# Patient Record
Sex: FEMALE | Race: WHITE | NOT HISPANIC OR LATINO | Employment: OTHER | URBAN - METROPOLITAN AREA
[De-identification: names, ages, dates, MRNs, and addresses within clinical notes are randomized per-mention and may not be internally consistent; named-entity substitution may affect disease eponyms.]

---

## 2017-01-13 ENCOUNTER — HOSPITAL ENCOUNTER (OUTPATIENT)
Dept: MAMMOGRAPHY | Facility: HOSPITAL | Age: 77
Discharge: HOME | End: 2017-01-13
Admitting: FAMILY MEDICINE

## 2017-01-13 DIAGNOSIS — Z12.31 VISIT FOR SCREENING MAMMOGRAM: ICD-10-CM

## 2017-01-13 PROCEDURE — 77063 BREAST TOMOSYNTHESIS BI: CPT | Performed by: RADIOLOGY

## 2017-01-13 PROCEDURE — G0202 SCR MAMMO BI INCL CAD: HCPCS

## 2017-01-13 PROCEDURE — G0202 SCR MAMMO BI INCL CAD: HCPCS | Performed by: RADIOLOGY

## 2017-01-13 PROCEDURE — 77063 BREAST TOMOSYNTHESIS BI: CPT

## 2017-02-21 RX ORDER — SPIRONOLACTONE 25 MG/1
TABLET ORAL
Qty: 90 TABLET | Refills: 0 | Status: SHIPPED | OUTPATIENT
Start: 2017-02-21 | End: 2017-11-17

## 2017-05-10 ENCOUNTER — LAB (OUTPATIENT)
Dept: LAB | Facility: HOSPITAL | Age: 77
End: 2017-05-10

## 2017-05-10 ENCOUNTER — OFFICE VISIT (OUTPATIENT)
Dept: CARDIOLOGY | Facility: CLINIC | Age: 77
End: 2017-05-10

## 2017-05-10 VITALS
DIASTOLIC BLOOD PRESSURE: 74 MMHG | BODY MASS INDEX: 26.13 KG/M2 | HEART RATE: 74 BPM | HEIGHT: 62 IN | SYSTOLIC BLOOD PRESSURE: 120 MMHG | WEIGHT: 142 LBS

## 2017-05-10 DIAGNOSIS — I27.20 PULMONARY HYPERTENSION (HCC): ICD-10-CM

## 2017-05-10 DIAGNOSIS — IMO0001 NORMAL CORONARY ARTERIES: ICD-10-CM

## 2017-05-10 DIAGNOSIS — E78.2 MIXED HYPERLIPIDEMIA: ICD-10-CM

## 2017-05-10 DIAGNOSIS — I50.32 CHRONIC DIASTOLIC HEART FAILURE (HCC): Primary | ICD-10-CM

## 2017-05-10 DIAGNOSIS — N18.2 KIDNEY DISEASE, CHRONIC, STAGE II (GFR 60-89 ML/MIN): ICD-10-CM

## 2017-05-10 DIAGNOSIS — I34.0 NON-RHEUMATIC MITRAL REGURGITATION: ICD-10-CM

## 2017-05-10 DIAGNOSIS — I10 ESSENTIAL HYPERTENSION: ICD-10-CM

## 2017-05-10 LAB
ALBUMIN SERPL-MCNC: 4 G/DL (ref 3.2–4.8)
ALBUMIN/GLOB SERPL: 1.5 G/DL (ref 1.5–2.5)
ALP SERPL-CCNC: 114 U/L (ref 25–100)
ALT SERPL W P-5'-P-CCNC: 14 U/L (ref 7–40)
ANION GAP SERPL CALCULATED.3IONS-SCNC: 7 MMOL/L (ref 3–11)
AST SERPL-CCNC: 21 U/L (ref 0–33)
BILIRUB SERPL-MCNC: 0.4 MG/DL (ref 0.3–1.2)
BUN BLD-MCNC: 24 MG/DL (ref 9–23)
BUN/CREAT SERPL: 17.1 (ref 7–25)
CALCIUM SPEC-SCNC: 9.3 MG/DL (ref 8.7–10.4)
CHLORIDE SERPL-SCNC: 100 MMOL/L (ref 99–109)
CO2 SERPL-SCNC: 26 MMOL/L (ref 20–31)
CREAT BLD-MCNC: 1.4 MG/DL (ref 0.6–1.3)
GFR SERPL CREATININE-BSD FRML MDRD: 36 ML/MIN/1.73
GLOBULIN UR ELPH-MCNC: 2.6 GM/DL
GLUCOSE BLD-MCNC: 140 MG/DL (ref 70–100)
POTASSIUM BLD-SCNC: 4.7 MMOL/L (ref 3.5–5.5)
PROT SERPL-MCNC: 6.6 G/DL (ref 5.7–8.2)
SODIUM BLD-SCNC: 133 MMOL/L (ref 132–146)

## 2017-05-10 PROCEDURE — 80053 COMPREHEN METABOLIC PANEL: CPT | Performed by: NURSE PRACTITIONER

## 2017-05-10 PROCEDURE — 99213 OFFICE O/P EST LOW 20 MIN: CPT | Performed by: INTERNAL MEDICINE

## 2017-05-10 PROCEDURE — 36415 COLL VENOUS BLD VENIPUNCTURE: CPT

## 2017-05-12 ENCOUNTER — TELEPHONE (OUTPATIENT)
Dept: CARDIOLOGY | Facility: CLINIC | Age: 77
End: 2017-05-12

## 2017-06-21 ENCOUNTER — HOSPITAL ENCOUNTER (OUTPATIENT)
Dept: GENERAL RADIOLOGY | Facility: HOSPITAL | Age: 77
Discharge: HOME | End: 2017-06-21
Admitting: FAMILY MEDICINE

## 2017-06-21 DIAGNOSIS — R05.9 COUGH: ICD-10-CM

## 2017-06-21 PROCEDURE — 71020 HC CHEST PA AND LATERAL: CPT

## 2017-06-21 PROCEDURE — 71020 XR CHEST PA AND LATERAL: CPT | Performed by: RADIOLOGY

## 2017-08-08 ENCOUNTER — TRANSCRIBE ORDERS (OUTPATIENT)
Dept: MAMMOGRAPHY | Facility: HOSPITAL | Age: 77
End: 2017-08-08

## 2017-08-08 DIAGNOSIS — Z12.31 VISIT FOR SCREENING MAMMOGRAM: Primary | ICD-10-CM

## 2017-08-10 RX ORDER — LISINOPRIL 20 MG/1
TABLET ORAL
Qty: 180 TABLET | Refills: 3 | Status: SHIPPED | OUTPATIENT
Start: 2017-08-10 | End: 2017-11-17 | Stop reason: SDUPTHER

## 2017-11-17 ENCOUNTER — OFFICE VISIT (OUTPATIENT)
Dept: CARDIOLOGY | Facility: CLINIC | Age: 77
End: 2017-11-17

## 2017-11-17 VITALS
BODY MASS INDEX: 25.37 KG/M2 | HEART RATE: 72 BPM | WEIGHT: 143.2 LBS | DIASTOLIC BLOOD PRESSURE: 82 MMHG | HEIGHT: 63 IN | SYSTOLIC BLOOD PRESSURE: 143 MMHG

## 2017-11-17 DIAGNOSIS — I10 ESSENTIAL HYPERTENSION: ICD-10-CM

## 2017-11-17 DIAGNOSIS — I34.0 NON-RHEUMATIC MITRAL REGURGITATION: Primary | ICD-10-CM

## 2017-11-17 DIAGNOSIS — I50.32 CHRONIC DIASTOLIC HEART FAILURE (HCC): ICD-10-CM

## 2017-11-17 DIAGNOSIS — E78.2 MIXED HYPERLIPIDEMIA: ICD-10-CM

## 2017-11-17 DIAGNOSIS — IMO0001 NORMAL CORONARY ARTERIES: ICD-10-CM

## 2017-11-17 PROCEDURE — 99214 OFFICE O/P EST MOD 30 MIN: CPT | Performed by: INTERNAL MEDICINE

## 2017-11-17 RX ORDER — LISINOPRIL 10 MG/1
10 TABLET ORAL DAILY
Qty: 90 TABLET | Refills: 3
Start: 2017-11-17 | End: 2018-11-12

## 2017-11-17 RX ORDER — CHLORAL HYDRATE 500 MG
CAPSULE ORAL
COMMUNITY

## 2017-11-17 RX ORDER — ROSUVASTATIN CALCIUM 10 MG/1
10 TABLET, COATED ORAL DAILY
Qty: 30 TABLET | Refills: 11
Start: 2017-11-17 | End: 2018-11-12

## 2017-11-17 RX ORDER — LEVOTHYROXINE SODIUM 112 UG/1
112 TABLET ORAL DAILY
COMMUNITY

## 2017-11-17 NOTE — PROGRESS NOTES
Encounter Date:11/17/2017    Patient ID: Shayna Mcmahon is a 77 y.o. female who resides in [place], [place].    CC/Reason for visit:  Chronic Diastolic Heart Failure (6 month follow up); Hypertension; Hyperlipidemia; and Mitral Valve Regurgitation            Shayna Mcmahon returns to my office as a 6 month follow up of her chronic diastolic heart failure, mitral valve regurgitation, hypertension, and hyperlipidemia. Since last visit, patient has been feeling well overall from a cardiovascular standpoint. She states that her breathing is stable unless she over-exerts herself--i.e. walking up a hill or walking up stairs carrying a baby.  He has recently started seeing  for renal insufficiency.  He stop her spironolactone and started her on lisinopril.  She is scheduled to see him on Monday.  She denies orthopnea PND or exertional chest pain.        Review of Systems   Constitution: Negative for weakness and malaise/fatigue.   Eyes: Negative for vision loss in left eye and vision loss in right eye.   Cardiovascular: Positive for leg swelling. Negative for chest pain, dyspnea on exertion, near-syncope, orthopnea, palpitations, paroxysmal nocturnal dyspnea and syncope.   Respiratory: Positive for wheezing.    Skin: Positive for dry skin and itching.   Musculoskeletal: Positive for arthritis and neck pain. Negative for myalgias.   Gastrointestinal: Positive for flatus.   Genitourinary: Positive for bladder incontinence and frequency.   Neurological: Negative for brief paralysis, excessive daytime sleepiness, focal weakness, numbness and paresthesias.   All other systems reviewed and are negative.      The patient's past medical, social, family history and ROS reviewed in the patient's electronic medical record.    Allergies  Garlic and Tylenol [acetaminophen]    Outpatient Prescriptions Marked as Taking for the 11/17/17 encounter (Office Visit) with Keegan Rodriguez IV, MD   Medication Sig Dispense Refill  "  • aspirin 81 MG EC tablet Take 81 mg by mouth daily.     • Calcium Citrate-Vitamin D (CALCIUM + D PO) Take  by mouth Daily.     • desloratadine (CLARINEX) 5 MG tablet Take 5 mg by mouth daily.     • esomeprazole (NexIUM) 40 MG capsule Take 40 mg by mouth daily.     • furosemide (LASIX) 40 MG tablet Take 1 tablet by mouth Daily. 90 tablet 2   • levothyroxine (SYNTHROID, LEVOTHROID) 112 MCG tablet Take 112 mcg by mouth Daily.     • lisinopril (PRINIVIL,ZESTRIL) 10 MG tablet Take 1 tablet by mouth Daily for 360 days. 90 tablet 3   • meclizine (ANTIVERT) 25 MG tablet Take 25 mg by mouth daily.     • Omega-3 Fatty Acids (FISH OIL) 1000 MG capsule capsule Take  by mouth Daily With Breakfast.     • rosuvastatin (CRESTOR) 10 MG tablet Take 1 tablet by mouth Daily for 360 days. 30 tablet 11   • [DISCONTINUED] lisinopril (PRINIVIL,ZESTRIL) 20 MG tablet TAKE ONE TABLET BY MOUTH TWICE A  tablet 3   • [DISCONTINUED] rosuvastatin (CRESTOR) 10 MG tablet Take 10 mg by mouth daily.     • [DISCONTINUED] SYNTHROID 125 MCG tablet Take 118 mcg by mouth Daily.           Blood pressure 143/82, pulse 72, height 63\" (160 cm), weight 143 lb 3.2 oz (65 kg).  Body mass index is 25.37 kg/(m^2).    Physical Exam   Constitutional: She is oriented to person, place, and time. She appears well-developed and well-nourished.   HENT:   Head: Normocephalic and atraumatic.   Eyes: Pupils are equal, round, and reactive to light. No scleral icterus.   Neck: No JVD present. Carotid bruit is not present. No thyromegaly present.   Cardiovascular: Normal rate, regular rhythm, S1 normal and S2 normal.  Exam reveals no gallop.    Murmur heard.  High-pitched blowing holosystolic murmur is present with a grade of 1/6  at the apex  Pulmonary/Chest: Effort normal and breath sounds normal.   Abdominal: Soft. There is no hepatosplenomegaly. There is no tenderness.   Neurological: She is alert and oriented to person, place, and time.   Skin: Skin is warm and " dry. No cyanosis. Nails show no clubbing.   Psychiatric: She has a normal mood and affect. Her behavior is normal.       Data Review:     Lab Results   Component Value Date    GLUCOSE 140 (H) 05/10/2017    BUN 24 (H) 05/10/2017    CREATININE 1.40 (H) 05/10/2017    EGFRIFNONA 36 (L) 05/10/2017    EGFRIFAFRI  09/20/2016      Comment:      <15 Indicative of kidney failure.    BCR 17.1 05/10/2017    K 4.7 05/10/2017    CO2 26.0 05/10/2017    CALCIUM 9.3 05/10/2017    ALBUMIN 4.00 05/10/2017    LABIL2 1.5 05/10/2017    AST 21 05/10/2017    ALT 14 05/10/2017     Procedures       Problem List Items Addressed This Visit        Cardiovascular and Mediastinum    Chronic diastolic heart failure    Overview     · History of cardiac catheterization with normal coronary arteries at [facility]; data deficit.  · Echocardiogram (9/30/2015):  LVEF 60%.  Moderate MR.  RVSP 57 mmHg.         Current Assessment & Plan     · Stable functional class II symptoms  · Continue lisinopril and furosemide as directed by Dr. Soto         Relevant Medications    lisinopril (PRINIVIL,ZESTRIL) 10 MG tablet    Essential hypertension    Relevant Medications    lisinopril (PRINIVIL,ZESTRIL) 10 MG tablet    Hyperlipidemia    Overview     · Statin therapy for primary prevention         Current Assessment & Plan     · Dr. Washington to manage statin therapy         Relevant Medications    rosuvastatin (CRESTOR) 10 MG tablet    Non-rheumatic mitral regurgitation - Primary    Overview     · Moderate MR echocardiogram 09/30/2015         Normal coronary arteries    Overview     · Cardiac catheterization at [facility]: Normal coronary arteries. data deficit                    · Continue present medical therapy  · Return to clinic in one year    Keegan Rodriguez IV, MD  11/17/2017    Scribed for Keegan Rodriguez IV, MD by Albert Yoon. 11/17/2017  5:37 PM

## 2017-11-17 NOTE — ASSESSMENT & PLAN NOTE
· Stable functional class II symptoms  · Continue lisinopril and furosemide as directed by Dr. Soto

## 2018-02-15 ENCOUNTER — APPOINTMENT (OUTPATIENT)
Dept: MAMMOGRAPHY | Facility: HOSPITAL | Age: 78
End: 2018-02-15

## 2018-12-11 ENCOUNTER — OFFICE VISIT (OUTPATIENT)
Dept: CARDIOLOGY | Facility: CLINIC | Age: 78
End: 2018-12-11

## 2018-12-11 VITALS
HEART RATE: 71 BPM | DIASTOLIC BLOOD PRESSURE: 82 MMHG | BODY MASS INDEX: 26.22 KG/M2 | SYSTOLIC BLOOD PRESSURE: 140 MMHG | HEIGHT: 63 IN | WEIGHT: 148 LBS

## 2018-12-11 DIAGNOSIS — I34.0 NON-RHEUMATIC MITRAL REGURGITATION: ICD-10-CM

## 2018-12-11 DIAGNOSIS — I50.32 CHRONIC DIASTOLIC HEART FAILURE (HCC): Primary | ICD-10-CM

## 2018-12-11 DIAGNOSIS — I10 ESSENTIAL HYPERTENSION: ICD-10-CM

## 2018-12-11 DIAGNOSIS — E78.2 MIXED HYPERLIPIDEMIA: ICD-10-CM

## 2018-12-11 PROCEDURE — 99213 OFFICE O/P EST LOW 20 MIN: CPT | Performed by: INTERNAL MEDICINE

## 2018-12-11 RX ORDER — LISINOPRIL 20 MG/1
TABLET ORAL DAILY
COMMUNITY
Start: 2018-10-31

## 2018-12-11 NOTE — PROGRESS NOTES
Encounter Date:12/11/2018    Patient ID: Shayna Mcmahon is a  78 y.o. female who resides in [place], [place].    CC/Reason for visit:  Non-rheumatic mitral regurgitation and Pulmonary hypertension           Problem List Items Addressed This Visit        Cardiology Problems    Chronic diastolic heart failure (CMS/HCC) - Primary    Overview     · History of cardiac catheterization with normal coronary arteries at [facility]; data deficit.  · Echocardiogram (9/30/2015):  LVEF 60%.  Moderate MR.  RVSP 57 mmHg.         Non-rheumatic mitral regurgitation    Overview     · Moderate MR echocardiogram 09/30/2015         Essential hypertension    Relevant Medications    lisinopril (PRINIVIL,ZESTRIL) 20 MG tablet    Hyperlipidemia    Overview     · Statin therapy for primary prevention             78-year-old female with chronic diastolic heart failure, mitral regurgitation, pulmonary hypertension who is been stable from a cardiovascular standpoint.       · Continue present medical therapy.  · Return in about 1 year (around 12/11/2019) for Follow-up with [facility] only.         Shayna Mcmahon returns to the office today for annual follow-up of her chronic diastolic heart failure, mitral valve regurgitation, hypertension, and hyperlipidemia. Since last visit, she has been doing well overall from a cardiovascular standpoint. She monitors her BP at home, with readings typically around 140/80 mmHg. Patient denies chest pain, shortness of breath, PND, edema, palpitations, or syncope at this time.     Review of Systems   Constitution: Negative for weakness and malaise/fatigue.   Eyes: Negative for vision loss in left eye and vision loss in right eye.   Cardiovascular: Negative for chest pain, dyspnea on exertion, near-syncope, orthopnea, palpitations, paroxysmal nocturnal dyspnea and syncope.   Musculoskeletal: Positive for arthritis and falls. Negative for myalgias.   Neurological: Negative for brief paralysis, excessive daytime  "sleepiness, focal weakness, numbness and paresthesias.   Psychiatric/Behavioral: Positive for memory loss.   All other systems reviewed and are negative.      The patient's past medical, social, family history and ROS reviewed in the patient's electronic medical record.    Allergies  Garlic and Tylenol [acetaminophen]    Outpatient Medications Marked as Taking for the 12/11/18 encounter (Office Visit) with Keegan Rodriguez IV, MD   Medication Sig Dispense Refill   • aspirin 81 MG EC tablet Take 81 mg by mouth daily.     • Calcium Citrate-Vitamin D (CALCIUM + D PO) Take  by mouth Daily.     • desloratadine (CLARINEX) 5 MG tablet Take 5 mg by mouth daily.     • esomeprazole (NexIUM) 40 MG capsule Take 40 mg by mouth daily.     • furosemide (LASIX) 40 MG tablet Take 1 tablet by mouth Daily. 90 tablet 2   • levothyroxine (SYNTHROID, LEVOTHROID) 112 MCG tablet Take 112 mcg by mouth Daily.     • lisinopril (PRINIVIL,ZESTRIL) 20 MG tablet Daily.     • meclizine (ANTIVERT) 25 MG tablet Take 25 mg by mouth daily.     • Omega-3 Fatty Acids (FISH OIL) 1000 MG capsule capsule Take  by mouth Daily With Breakfast.           Blood pressure 140/82, pulse 71, height 160 cm (63\"), weight 67.1 kg (148 lb).  Body mass index is 26.22 kg/m².       Physical Exam   Constitutional: She is oriented to person, place, and time. She appears well-developed and well-nourished.   HENT:   Head: Normocephalic and atraumatic.   Eyes: Pupils are equal, round, and reactive to light. No scleral icterus.   Neck: No JVD present. Carotid bruit is not present. No thyromegaly present.   Cardiovascular: Normal rate, regular rhythm, S1 normal and S2 normal. Exam reveals no gallop.   No murmur heard.  Pulmonary/Chest: Effort normal and breath sounds normal.   Abdominal: Soft. There is no hepatosplenomegaly. There is no tenderness.   Neurological: She is alert and oriented to person, place, and time.   Skin: Skin is warm and dry. No cyanosis. Nails show " no clubbing.   Psychiatric: She has a normal mood and affect. Her behavior is normal.       Data Review:     Procedures    No recent lab results available for review.      Scribed for Keegan Rodriguez IV, MD by Rosanna Le. 12/14/2018  5:24 PM      Keegan Rodriguez IV, MD

## 2020-02-10 ENCOUNTER — TRANSCRIBE ORDERS (OUTPATIENT)
Dept: ADMINISTRATIVE | Facility: HOSPITAL | Age: 80
End: 2020-02-10

## 2020-02-10 DIAGNOSIS — Z12.31 VISIT FOR SCREENING MAMMOGRAM: Primary | ICD-10-CM

## 2020-02-19 ENCOUNTER — HOSPITAL ENCOUNTER (OUTPATIENT)
Dept: MAMMOGRAPHY | Facility: HOSPITAL | Age: 80
Discharge: HOME | End: 2020-02-19
Admitting: FAMILY MEDICINE

## 2020-02-19 DIAGNOSIS — Z12.31 VISIT FOR SCREENING MAMMOGRAM: ICD-10-CM

## 2020-02-19 PROCEDURE — 77063 BREAST TOMOSYNTHESIS BI: CPT | Performed by: RADIOLOGY

## 2020-02-19 PROCEDURE — 77067 SCR MAMMO BI INCL CAD: CPT

## 2020-02-19 PROCEDURE — 77063 BREAST TOMOSYNTHESIS BI: CPT

## 2020-02-19 PROCEDURE — 77067 SCR MAMMO BI INCL CAD: CPT | Performed by: RADIOLOGY
